# Patient Record
Sex: MALE | Race: WHITE | Employment: FULL TIME | URBAN - METROPOLITAN AREA
[De-identification: names, ages, dates, MRNs, and addresses within clinical notes are randomized per-mention and may not be internally consistent; named-entity substitution may affect disease eponyms.]

---

## 2024-05-20 ENCOUNTER — APPOINTMENT (OUTPATIENT)
Dept: CT IMAGING | Facility: HOSPITAL | Age: 64
End: 2024-05-20
Attending: EMERGENCY MEDICINE

## 2024-05-20 ENCOUNTER — HOSPITAL ENCOUNTER (EMERGENCY)
Facility: HOSPITAL | Age: 64
Discharge: HOME | End: 2024-05-20
Attending: EMERGENCY MEDICINE

## 2024-05-20 VITALS
RESPIRATION RATE: 18 BRPM | HEART RATE: 100 BPM | WEIGHT: 233 LBS | DIASTOLIC BLOOD PRESSURE: 87 MMHG | OXYGEN SATURATION: 95 % | SYSTOLIC BLOOD PRESSURE: 168 MMHG | TEMPERATURE: 99 F

## 2024-05-20 DIAGNOSIS — S00.03XA CONTUSION OF SCALP, INITIAL ENCOUNTER: ICD-10-CM

## 2024-05-20 DIAGNOSIS — S01.01XA LACERATION OF SCALP, INITIAL ENCOUNTER: Primary | ICD-10-CM

## 2024-05-20 DIAGNOSIS — S20.212A CONTUSION OF LEFT CHEST WALL, INITIAL ENCOUNTER: ICD-10-CM

## 2024-05-20 PROCEDURE — 12001 RPR S/N/AX/GEN/TRNK 2.5CM/<: CPT

## 2024-05-20 PROCEDURE — 72125 CT NECK SPINE W/O DYE: CPT | Performed by: EMERGENCY MEDICINE

## 2024-05-20 PROCEDURE — 70450 CT HEAD/BRAIN W/O DYE: CPT | Performed by: EMERGENCY MEDICINE

## 2024-05-20 PROCEDURE — 99284 EMERGENCY DEPT VISIT MOD MDM: CPT

## 2024-05-20 PROCEDURE — 71260 CT THORAX DX C+: CPT | Performed by: EMERGENCY MEDICINE

## 2024-05-20 PROCEDURE — 74177 CT ABD & PELVIS W/CONTRAST: CPT | Performed by: EMERGENCY MEDICINE

## 2024-05-20 PROCEDURE — 99285 EMERGENCY DEPT VISIT HI MDM: CPT

## 2024-05-20 RX ORDER — LIDOCAINE HYDROCHLORIDE 10 MG/ML
20 INJECTION, SOLUTION EPIDURAL; INFILTRATION; INTRACAUDAL; PERINEURAL ONCE
Status: COMPLETED | OUTPATIENT
Start: 2024-05-20 | End: 2024-05-20
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
## 2024-05-21 NOTE — DISCHARGE INSTRUCTIONS
Staple removal in 10 days.  Apply ice to the injured areas for 20 minutes at a time over the next 2 days.  Take Tylenol or ibuprofen as needed for pain.  Follow-up with occupational health for reevaluation and staple removal.  Return to the emergency department if severe headache, difficulty breathing, or other new symptoms develop.

## (undated) NOTE — LETTER
Date & Time: 5/20/2024, 11:03 PM  Patient: Lino Shepard  Encounter Provider(s):    Abebe Dupont MD       To Whom It May Concern:    Lino Shepard was seen and treated in our department on 5/20/2024. He should not return to work until 5/23/2024 .    If you have any questions or concerns, please do not hesitate to call.        _____________________________  Physician/[facility] Signature